# Patient Record
Sex: MALE | Race: WHITE | Employment: FULL TIME | URBAN - METROPOLITAN AREA
[De-identification: names, ages, dates, MRNs, and addresses within clinical notes are randomized per-mention and may not be internally consistent; named-entity substitution may affect disease eponyms.]

---

## 2017-04-29 PROBLEM — Z72.0 TOBACCO ABUSE: Status: ACTIVE | Noted: 2017-04-29

## 2017-04-29 PROBLEM — F19.10 IV DRUG ABUSE (HCC): Status: ACTIVE | Noted: 2017-04-29

## 2017-04-29 PROBLEM — R56.9 SEIZURES (HCC): Status: ACTIVE | Noted: 2017-04-29

## 2017-04-29 PROBLEM — L03.90 CELLULITIS: Status: ACTIVE | Noted: 2017-04-29

## 2017-04-29 PROBLEM — F32.A DEPRESSION: Status: ACTIVE | Noted: 2017-04-29

## 2017-05-01 PROBLEM — L03.114 LEFT ARM CELLULITIS: Status: ACTIVE | Noted: 2017-04-29

## 2017-05-02 PROBLEM — L02.414 ABSCESS OF LEFT ARM: Status: ACTIVE | Noted: 2017-05-02

## 2024-08-07 ENCOUNTER — HOSPITAL ENCOUNTER (OUTPATIENT)
Dept: GENERAL RADIOLOGY | Age: 34
Discharge: HOME | End: 2024-08-07
Payer: COMMERCIAL

## 2024-08-07 ENCOUNTER — HOSPITAL ENCOUNTER (OUTPATIENT)
Age: 34
Discharge: HOME | End: 2024-08-07
Payer: COMMERCIAL

## 2024-08-07 DIAGNOSIS — M54.50 LOW BACK PAIN, UNSPECIFIED BACK PAIN LATERALITY, UNSPECIFIED CHRONICITY, UNSPECIFIED WHETHER SCIATICA PRESENT: ICD-10-CM

## 2024-08-07 PROCEDURE — 72100 X-RAY EXAM L-S SPINE 2/3 VWS: CPT

## 2025-02-15 ENCOUNTER — HOSPITAL ENCOUNTER (EMERGENCY)
Age: 35
Discharge: HOME | End: 2025-02-15
Attending: EMERGENCY MEDICINE
Payer: COMMERCIAL

## 2025-02-15 VITALS
WEIGHT: 255 LBS | RESPIRATION RATE: 18 BRPM | DIASTOLIC BLOOD PRESSURE: 105 MMHG | OXYGEN SATURATION: 97 % | SYSTOLIC BLOOD PRESSURE: 140 MMHG | HEART RATE: 93 BPM | BODY MASS INDEX: 30.11 KG/M2 | TEMPERATURE: 99 F | HEIGHT: 77 IN

## 2025-02-15 DIAGNOSIS — J40 BRONCHITIS: Primary | ICD-10-CM

## 2025-02-15 DIAGNOSIS — R05.1 ACUTE COUGH: ICD-10-CM

## 2025-02-15 LAB
FLUAV RNA RESP QL NAA+PROBE: NOT DETECTED
FLUBV RNA RESP QL NAA+PROBE: NOT DETECTED
SARS-COV-2 RNA RESP QL NAA+PROBE: NOT DETECTED

## 2025-02-15 PROCEDURE — 6370000000 HC RX 637 (ALT 250 FOR IP): Performed by: EMERGENCY MEDICINE

## 2025-02-15 PROCEDURE — 99283 EMERGENCY DEPT VISIT LOW MDM: CPT

## 2025-02-15 PROCEDURE — 87636 SARSCOV2 & INF A&B AMP PRB: CPT

## 2025-02-15 RX ORDER — ACETAMINOPHEN 500 MG
1000 TABLET ORAL ONCE
Status: COMPLETED | OUTPATIENT
Start: 2025-02-15 | End: 2025-02-15

## 2025-02-15 RX ORDER — PSEUDOEPHEDRINE HCL 30 MG/1
60 TABLET, FILM COATED ORAL ONCE
Status: COMPLETED | OUTPATIENT
Start: 2025-02-15 | End: 2025-02-15

## 2025-02-15 RX ORDER — IBUPROFEN 800 MG/1
800 TABLET, FILM COATED ORAL ONCE
Status: COMPLETED | OUTPATIENT
Start: 2025-02-15 | End: 2025-02-15

## 2025-02-15 RX ORDER — PSEUDOEPHEDRINE HCL 120 MG/1
120 TABLET, FILM COATED, EXTENDED RELEASE ORAL EVERY 12 HOURS
Qty: 10 TABLET | Refills: 0 | Status: SHIPPED | OUTPATIENT
Start: 2025-02-15 | End: 2025-02-20

## 2025-02-15 RX ORDER — PREDNISONE 10 MG/1
TABLET ORAL
Qty: 20 TABLET | Refills: 0 | Status: SHIPPED | OUTPATIENT
Start: 2025-02-15 | End: 2025-02-22

## 2025-02-15 RX ORDER — IBUPROFEN 600 MG/1
600 TABLET, FILM COATED ORAL 4 TIMES DAILY PRN
Qty: 20 TABLET | Refills: 0 | Status: SHIPPED | OUTPATIENT
Start: 2025-02-15 | End: 2025-02-20

## 2025-02-15 RX ADMIN — ACETAMINOPHEN 1000 MG: 500 TABLET ORAL at 09:26

## 2025-02-15 RX ADMIN — PSEUDOEPHEDRINE HCL 60 MG: 30 TABLET, FILM COATED ORAL at 09:26

## 2025-02-15 RX ADMIN — IBUPROFEN 800 MG: 800 TABLET, FILM COATED ORAL at 09:26

## 2025-02-15 ASSESSMENT — ENCOUNTER SYMPTOMS
SHORTNESS OF BREATH: 0
VOMITING: 0
EYE DISCHARGE: 1
COUGH: 0
NAUSEA: 0
DIARRHEA: 0
ABDOMINAL PAIN: 0

## 2025-02-15 ASSESSMENT — PAIN - FUNCTIONAL ASSESSMENT: PAIN_FUNCTIONAL_ASSESSMENT: 0-10

## 2025-02-15 ASSESSMENT — PAIN SCALES - GENERAL: PAINLEVEL_OUTOF10: 6

## 2025-02-15 ASSESSMENT — LIFESTYLE VARIABLES
HOW MANY STANDARD DRINKS CONTAINING ALCOHOL DO YOU HAVE ON A TYPICAL DAY: PATIENT DOES NOT DRINK
HOW OFTEN DO YOU HAVE A DRINK CONTAINING ALCOHOL: NEVER

## 2025-02-15 ASSESSMENT — PAIN DESCRIPTION - LOCATION: LOCATION: CHEST

## 2025-02-15 NOTE — ED PROVIDER NOTES
Emergency Department Attending Physician Note  Location: [facility] EMERGENCY DEPARTMENT  2/15/2025       Pt Name: Deepak Smith  MRN: [id number]  Birthdate 1990    Date of evaluation: 2/15/2025  Provider: JAILYN COTA DO  PCP: None, None    Note Started: 9:02 AM EST 2/15/25    CHIEF COMPLAINT  Chief Complaint   Patient presents with    Cough     Reports cough, congestion x 3 days. Reports wife has flu       ROOM: R3    HISTORY OF PRESENT ILLNESS:  History obtained by patient. Limitations to history : None.     Deepak Smith is a 34 y.o. male with a significant PMHx of history of seizure disorder, headaches, and other comorbidities listed below, here in the emergency department today with some congestion over the past week or so, but last night he started with chills, and thinks he started to get a fever, 99 temperature here in the ER.  He does have a slight cough.  He says he does not know if he just has really bad allergies, as he usually does when he is working outside, because his congestion and his eyes have been watery over the past week or so, but last night he started to feel bad.  Supposed go to work this morning, but wanted to see if anything else was wrong.  No headaches, vision changes, or chest pain.  No nausea vomiting diarrhea.  Both patient's wife and child are ill at this time.  Took ibuprofen and DayQuil overnight last night.  No other concerns today in the emergency department.       Nursing Notes were all reviewed and agreed with or any disagreements were addressed in the HPI.      MEDICAL HISTORY  Past Medical History:   Diagnosis Date    Depression     Headache(784.0)     Hepatitis C antibody positive in blood 04/29/2017    chronic    Hypertension     MRSA (methicillin resistant staph aureus) culture positive 05/02/2017    tissue    Seizures (HCC)         SURGICAL HISTORY  Past Surgical History:   Procedure Laterality Date    ARM SURGERY Left 05/02/2017    incision and

## 2025-02-15 NOTE — DISCHARGE INSTRUCTIONS
Your COVID swab and flu swab were negative today, but I would like you to take some steroids, as you clearly have an upper respiratory tract infection with worsening cough and the combination of steroid and Sudafed will help with your congestion.    Come back for any shortness of breath, difficulty breathing.    Ibuprofen for any fevers.    It may have been just too early to test you for influenza, as your symptoms started only getting worse last night, and continue good hand hygiene, and covering your mouth and nose when you cough    Work note provided.